# Patient Record
Sex: MALE | Race: BLACK OR AFRICAN AMERICAN | Employment: OTHER | URBAN - METROPOLITAN AREA
[De-identification: names, ages, dates, MRNs, and addresses within clinical notes are randomized per-mention and may not be internally consistent; named-entity substitution may affect disease eponyms.]

---

## 2017-04-15 ENCOUNTER — APPOINTMENT (OUTPATIENT)
Dept: ULTRASOUND IMAGING | Facility: HOSPITAL | Age: 77
DRG: 871 | End: 2017-04-15
Attending: EMERGENCY MEDICINE
Payer: MEDICARE

## 2017-04-15 ENCOUNTER — APPOINTMENT (OUTPATIENT)
Dept: GENERAL RADIOLOGY | Facility: HOSPITAL | Age: 77
DRG: 871 | End: 2017-04-15
Attending: EMERGENCY MEDICINE
Payer: MEDICARE

## 2017-04-15 ENCOUNTER — HOSPITAL ENCOUNTER (INPATIENT)
Facility: HOSPITAL | Age: 77
LOS: 9 days | Discharge: HOME | DRG: 871 | End: 2017-04-24
Attending: EMERGENCY MEDICINE | Admitting: HOSPITALIST
Payer: MEDICARE

## 2017-04-15 DIAGNOSIS — L03.115 CELLULITIS OF RIGHT LOWER EXTREMITY: ICD-10-CM

## 2017-04-15 DIAGNOSIS — I89.0 LYMPHEDEMA: Primary | ICD-10-CM

## 2017-04-15 DIAGNOSIS — J18.9 COMMUNITY ACQUIRED PNEUMONIA: ICD-10-CM

## 2017-04-15 PROCEDURE — 71010 XR CHEST AP PORTABLE  (CPT=71010): CPT

## 2017-04-15 PROCEDURE — 93970 EXTREMITY STUDY: CPT

## 2017-04-15 PROCEDURE — 99223 1ST HOSP IP/OBS HIGH 75: CPT | Performed by: HOSPITALIST

## 2017-04-15 RX ORDER — NIACIN 500 MG/1
500 TABLET, EXTENDED RELEASE ORAL NIGHTLY
Status: ON HOLD | COMMUNITY
End: 2017-04-24

## 2017-04-15 RX ORDER — IPRATROPIUM BROMIDE AND ALBUTEROL SULFATE 2.5; .5 MG/3ML; MG/3ML
3 SOLUTION RESPIRATORY (INHALATION)
Status: DISCONTINUED | OUTPATIENT
Start: 2017-04-16 | End: 2017-04-20

## 2017-04-15 RX ORDER — SIMVASTATIN 20 MG
20 TABLET ORAL NIGHTLY
COMMUNITY

## 2017-04-15 RX ORDER — DEXTROSE MONOHYDRATE 25 G/50ML
50 INJECTION, SOLUTION INTRAVENOUS AS NEEDED
Status: DISCONTINUED | OUTPATIENT
Start: 2017-04-15 | End: 2017-04-24

## 2017-04-15 RX ORDER — ACETAMINOPHEN 325 MG/1
650 TABLET ORAL EVERY 6 HOURS PRN
COMMUNITY

## 2017-04-15 RX ORDER — ACETAMINOPHEN 325 MG/1
650 TABLET ORAL EVERY 6 HOURS PRN
Status: DISCONTINUED | OUTPATIENT
Start: 2017-04-15 | End: 2017-04-24

## 2017-04-15 RX ORDER — ZINC SULFATE 50(220)MG
220 CAPSULE ORAL DAILY
COMMUNITY

## 2017-04-15 RX ORDER — ASCORBIC ACID 500 MG
500 TABLET ORAL DAILY
COMMUNITY

## 2017-04-15 RX ORDER — CARVEDILOL 3.12 MG/1
3.12 TABLET ORAL 2 TIMES DAILY WITH MEALS
COMMUNITY

## 2017-04-15 RX ORDER — ASPIRIN 325 MG
325 TABLET ORAL DAILY
Status: DISCONTINUED | OUTPATIENT
Start: 2017-04-16 | End: 2017-04-21

## 2017-04-15 RX ORDER — HEPARIN SODIUM 5000 [USP'U]/ML
5000 INJECTION, SOLUTION INTRAVENOUS; SUBCUTANEOUS EVERY 12 HOURS SCHEDULED
Status: ON HOLD | COMMUNITY
End: 2017-04-24

## 2017-04-15 RX ORDER — HEPARIN SODIUM 5000 [USP'U]/ML
5000 INJECTION, SOLUTION INTRAVENOUS; SUBCUTANEOUS EVERY 8 HOURS
Status: DISCONTINUED | OUTPATIENT
Start: 2017-04-15 | End: 2017-04-18

## 2017-04-15 RX ORDER — SODIUM CHLORIDE 9 MG/ML
INJECTION, SOLUTION INTRAVENOUS CONTINUOUS
Status: DISCONTINUED | OUTPATIENT
Start: 2017-04-15 | End: 2017-04-18

## 2017-04-15 RX ORDER — NITROGLYCERIN 0.4 MG/1
0.4 TABLET SUBLINGUAL EVERY 5 MIN PRN
Status: DISCONTINUED | OUTPATIENT
Start: 2017-04-15 | End: 2017-04-24

## 2017-04-15 RX ORDER — ASPIRIN 81 MG/1
TABLET, CHEWABLE ORAL
Status: DISPENSED
Start: 2017-04-15 | End: 2017-04-16

## 2017-04-15 RX ORDER — MONTELUKAST SODIUM 10 MG/1
10 TABLET ORAL NIGHTLY
COMMUNITY

## 2017-04-15 RX ORDER — SODIUM CHLORIDE 9 MG/ML
INJECTION, SOLUTION INTRAVENOUS
Status: COMPLETED
Start: 2017-04-15 | End: 2017-04-17

## 2017-04-15 RX ORDER — ASPIRIN 81 MG/1
324 TABLET, CHEWABLE ORAL ONCE
Status: COMPLETED | OUTPATIENT
Start: 2017-04-15 | End: 2017-04-15

## 2017-04-15 RX ORDER — DOXEPIN HYDROCHLORIDE 50 MG/1
1 CAPSULE ORAL DAILY
COMMUNITY

## 2017-04-15 RX ORDER — FUROSEMIDE 10 MG/ML
40 INJECTION INTRAMUSCULAR; INTRAVENOUS ONCE
Status: COMPLETED | OUTPATIENT
Start: 2017-04-16 | End: 2017-04-16

## 2017-04-15 RX ORDER — AMLODIPINE BESYLATE 10 MG/1
10 TABLET ORAL DAILY
COMMUNITY

## 2017-04-15 RX ORDER — RISPERIDONE 1 MG/1
1 TABLET, FILM COATED ORAL 2 TIMES DAILY
COMMUNITY

## 2017-04-15 RX ORDER — ONDANSETRON 2 MG/ML
4 INJECTION INTRAMUSCULAR; INTRAVENOUS EVERY 6 HOURS PRN
Status: DISCONTINUED | OUTPATIENT
Start: 2017-04-15 | End: 2017-04-24

## 2017-04-15 NOTE — ED NOTES
IV established. Lab work sent- blood cultures still needed. Patient to US at this time. Vitals stable.

## 2017-04-15 NOTE — ED NOTES
Phlebotomy contacted for lab work. IV placement and use of US has been unsuccessful. Additional attempts continued. Dr. Steffany Patino aware.

## 2017-04-15 NOTE — ED PROVIDER NOTES
Patient Seen in: [facility] Emergency Department    History   Patient presents with:  Deep Vein Thrombosis (cardiovascular)    Stated Complaint: Bilteral Leg pain     HPI  History is difficult as patient is a poor historian secondary to dementia/s 1 mg by mouth 2 (two) times daily. zinc sulfate 220 MG Oral Cap,  Take 220 mg by mouth daily. No family history on file.     Tobacco/Alcohol use not on file    Review of Systems    Positive for stated complaint: Bilteral Leg pain   Other systems are deficit. Skin: Skin is warm and dry. No rash noted. Psychiatric: He has a normal mood and affect. Nursing note and vitals reviewed.           ED Course     Labs Reviewed   BASIC METABOLIC PANEL (8) - Abnormal; Notable for the following:     Glucose 13 Intervals:normal        QRS: low voltage        ST segments:nonspecific T abnormalities        INTERPRETATION: Sinus tach nonspecific T abnormalities possible pulmonary disease  The EKG was interpreted by myself              CHAR     Pt with cellulitis vs D pneumonia/infiltrate. Patient developed a fever in the ER. He remained tachycardic. There was no evidence of DVT on his ultrasound and his chest x-ray had no PE however he did have a opacity in the lingular segment of the left lobe.   I am going t

## 2017-04-15 NOTE — ED INITIAL ASSESSMENT (HPI)
PT used lifeline for c/o of Chronic bilateral leg swelling/pain increased on the RLE. Patient baseline A&Ox2. Pulse in 130's. Patient placed on 2L O2 via NC pta.

## 2017-04-16 ENCOUNTER — APPOINTMENT (OUTPATIENT)
Dept: CT IMAGING | Facility: HOSPITAL | Age: 77
DRG: 871 | End: 2017-04-16
Attending: HOSPITALIST
Payer: MEDICARE

## 2017-04-16 ENCOUNTER — APPOINTMENT (OUTPATIENT)
Dept: CV DIAGNOSTICS | Facility: HOSPITAL | Age: 77
DRG: 871 | End: 2017-04-16
Attending: HOSPITALIST
Payer: MEDICARE

## 2017-04-16 PROCEDURE — 99233 SBSQ HOSP IP/OBS HIGH 50: CPT | Performed by: HOSPITALIST

## 2017-04-16 PROCEDURE — 93306 TTE W/DOPPLER COMPLETE: CPT | Performed by: INTERNAL MEDICINE

## 2017-04-16 PROCEDURE — 93306 TTE W/DOPPLER COMPLETE: CPT

## 2017-04-16 PROCEDURE — 73700 CT LOWER EXTREMITY W/O DYE: CPT

## 2017-04-16 RX ORDER — NIACIN 500 MG/1
500 TABLET, EXTENDED RELEASE ORAL NIGHTLY
Status: DISCONTINUED | OUTPATIENT
Start: 2017-04-16 | End: 2017-04-17

## 2017-04-16 RX ORDER — RISPERIDONE 1 MG/1
1 TABLET, FILM COATED ORAL 2 TIMES DAILY
Status: DISCONTINUED | OUTPATIENT
Start: 2017-04-16 | End: 2017-04-24

## 2017-04-16 RX ORDER — ACETAMINOPHEN 325 MG/1
650 TABLET ORAL EVERY 6 HOURS PRN
Status: DISCONTINUED | OUTPATIENT
Start: 2017-04-16 | End: 2017-04-16

## 2017-04-16 RX ORDER — PANTOPRAZOLE SODIUM 40 MG/1
40 TABLET, DELAYED RELEASE ORAL
Status: DISCONTINUED | OUTPATIENT
Start: 2017-04-16 | End: 2017-04-24

## 2017-04-16 RX ORDER — DOXEPIN HYDROCHLORIDE 50 MG/1
1 CAPSULE ORAL DAILY
Status: DISCONTINUED | OUTPATIENT
Start: 2017-04-16 | End: 2017-04-24

## 2017-04-16 RX ORDER — MONTELUKAST SODIUM 10 MG/1
10 TABLET ORAL NIGHTLY
Status: DISCONTINUED | OUTPATIENT
Start: 2017-04-16 | End: 2017-04-24

## 2017-04-16 RX ORDER — ATORVASTATIN CALCIUM 10 MG/1
10 TABLET, FILM COATED ORAL NIGHTLY
Status: DISCONTINUED | OUTPATIENT
Start: 2017-04-16 | End: 2017-04-17

## 2017-04-16 RX ORDER — CARVEDILOL 3.12 MG/1
3.12 TABLET ORAL 2 TIMES DAILY WITH MEALS
Status: DISCONTINUED | OUTPATIENT
Start: 2017-04-16 | End: 2017-04-17

## 2017-04-16 RX ORDER — ASCORBIC ACID 500 MG
500 TABLET ORAL DAILY
Status: DISCONTINUED | OUTPATIENT
Start: 2017-04-16 | End: 2017-04-24

## 2017-04-16 RX ORDER — AMLODIPINE BESYLATE 10 MG/1
10 TABLET ORAL DAILY
Status: DISCONTINUED | OUTPATIENT
Start: 2017-04-16 | End: 2017-04-17

## 2017-04-16 RX ORDER — ZINC SULFATE 50(220)MG
220 CAPSULE ORAL DAILY
Status: DISCONTINUED | OUTPATIENT
Start: 2017-04-16 | End: 2017-04-24

## 2017-04-16 NOTE — PROGRESS NOTES
[place] [facility] - [facility]    Progress Note    Frank Nobles Patient Status:  Inpatient    10/22/1940 MRN [id number]   Location [facility] 3W/SW Attending Ligia Anne, [address] [facility] Day # 1 PCP Alyssa Day MD       Subjective:   Dia is Oral Q15 Min PRN   aspirin tab 325 mg 325 mg Oral Daily   nitroGLYCERIN (NITROSTAT) SL tab 0.4 mg 0.4 mg Sublingual Q5 Min PRN   Atropine Sulfate 0.1 MG/ML injection 0.5 mg 0.5 mg Intravenous PRN   0.9%  NaCl infusion  Intravenous Continuous   Heparin Sodi Results  Component Value Date   HGB 11.7* 04/16/2017   HGB 12.8* 04/15/2017      Lab Results  Component Value Date    04/16/2017    04/15/2017       Recent Labs   Lab  04/15/17   1730   GLU  135*   BUN  37*   CREATSERUM  1.65*   GFRAA  49* Cont to monitor for now. PT is FULL code. 8.      History of congestive heart failure, systolic versus diastolic.  It is not clear.  Await echo results.     9.      Chronic lymphedema.  We will ask Wound Care to evaluate.  Currently, the lower extremity

## 2017-04-16 NOTE — CONSULTS
Called to see patient for potential compartment syndrome. Patient here since 1630 yesterday. Patient offers no history, he just grunts to touch. By notes and discussion with Dr. Gale Fleischer he does communicate at times, but not with me currently.  Bilateral

## 2017-04-16 NOTE — PROGRESS NOTES
120 [facility] dosing service    Initial Pharmacokinetic Consult for Vancomycin Dosing     Frank Abrams is a 77year old male admitted on 4/15 who is being treated for cellulitis.   Pharmacy has been asked to dose Vancomycin by Dr. Alexander Fiore    He is allergi panel order ED/MRSA SCREEN BY [facility].   Procedure                               Abnormality         Status                     ---------                               -----------         ------                     ED/MRSA SCREEN BY [facility][[id number]]     Nor

## 2017-04-16 NOTE — BH PROGRESS NOTE
Reevaluated patient   He did not c/o pain at all overnight. Did not request pain medication. RLE calf remains swollen but soft is, however, painful to palpation Do not feel any areas of fluctuance. Pedal pulse are easily dopplered bilaterally.   Will get

## 2017-04-16 NOTE — BH PROGRESS NOTE
ADMISSION NOTE    77year old male with h/o Lymphedema, DM, CHF Dementia COPD presents with LE edema and cough. CT positive for pneumonia no PE . Available medical records partially reviewed. Dictation to follow.     Raleigh Francisco M.D.  4/15/2017

## 2017-04-16 NOTE — H&P
[facility]    PATIENT'S NAME: MICHELLE MCGILL   ATTENDING PHYSICIAN: Ivis Oviedo MD   PATIENT ACCOUNT#:   [id number]    LOCATION:  [address] [place] [address] #:   [id number]       YOB: 1940  ADMISSION DATE:       04/1 vancomycin and Zosyn and was admitted for further evaluation and treatment. PAST MEDICAL HISTORY:    1. Hypertension. 2.   Type 1 diabetes mellitus. He states his Accu-Cheks typically run in the 200 range.   3.   Previous episodes of lower extremity the emergency room, his temperature was 101.9, heart rate of 128, respiratory rate of 22. When I saw him, his temperature was 98.3 with a heart rate of 116, respiratory rate of 22, and blood pressure of 120/58, 98% saturated.   HEENT:  Normocephalic, atrau potassium 4.3, chloride 97, CO2 28, BUN of 37 with a creatinine of 1.65, calcium 9.1, anion gap was 10. Lactic acid level was 1.5. Troponin was 0.04. INR was 1.3. White count was 12.4 with a hemoglobin of 12.8 and a platelet count of a 483,504.   There intravascular volume depletion. 5.   Diabetes mellitus type 1. Accu-Cheks a.c., at bedtime, cover with sliding scale insulin. 6.   Rule out dysphagia given the location of the patient's pneumonia raising the possibility of aspiration pneumonia.   We will

## 2017-04-16 NOTE — CONSULTS
17  0950   BP: 117/55   Pulse: 115   Temp: 98.1 °F (36.7 °C)   Resp: [facility]  Report of Consultation    Frank Nobles Patient Status:  Inpatient    10/22/1940 MRN [id number]   Location [facility] 3W/SW Attending Emanuel Hoff Daily  •  niacin (NIASPAN) CR tab, 500 mg, Oral, Nightly  •  risperiDONE (RISPERDAL) tab 1 mg, 1 mg, Oral, BID  •  Atorvastatin Calcium (LIPITOR) tab 10 mg, 10 mg, Oral, Nightly  •  zinc sulfate (ZINCATE) cap 220 mg, 220 mg, Oral, Daily  •  Pantoprazole So is gangrenous. His feet are warm and perfusion seems to be good. I could feel a very weak dorsalis pedis pulse more proximally where the edema gets a little bit less around the area of the ankle.   I inspected the areas proximal to the toes where that lym he is in the hospital and try and get that done for him while he is here. Right now I do not believe that the nidus of the infection is his right foot.   The CT scans are negative for any osteomyelitis and do not show any well-organized fluid accumulation

## 2017-04-16 NOTE — CONSULTS
[facility]    PATIENT'S NAME: MICHELLE MCGILL   ATTENDING PHYSICIAN: Mera Lees MD   CONSULTING PHYSICIAN: Rosy Shah MD   PATIENT ACCOUNT#:   [id number]    LOCATION:  [address] [place] [address] RECORD #:   [id number]       DATE OF B anxiety. SOCIAL HISTORY:  He has lived in a nursing home for 20 years. He drinks occasionally. He does not use street drugs.   At some point, he worked as a  and was  once in the past.  There was a previous tobacco history; but again, the examination. His issues seem to be related to his poor vascular function of his lower extremities, and at some point the patient may need consideration of amputation.   At this point, urgent amputation is not necessary and vascular issues can be addressed

## 2017-04-17 ENCOUNTER — APPOINTMENT (OUTPATIENT)
Dept: ULTRASOUND IMAGING | Facility: HOSPITAL | Age: 77
DRG: 871 | End: 2017-04-17
Attending: HOSPITALIST
Payer: MEDICARE

## 2017-04-17 PROCEDURE — 99233 SBSQ HOSP IP/OBS HIGH 50: CPT | Performed by: HOSPITALIST

## 2017-04-17 RX ORDER — SODIUM CHLORIDE 9 MG/ML
INJECTION, SOLUTION INTRAVENOUS CONTINUOUS
Status: DISCONTINUED | OUTPATIENT
Start: 2017-04-17 | End: 2017-04-17

## 2017-04-17 RX ORDER — CARVEDILOL 6.25 MG/1
6.25 TABLET ORAL 2 TIMES DAILY WITH MEALS
Status: DISCONTINUED | OUTPATIENT
Start: 2017-04-17 | End: 2017-04-24

## 2017-04-17 RX ORDER — 0.9 % SODIUM CHLORIDE 0.9 %
VIAL (ML) INJECTION
Status: COMPLETED
Start: 2017-04-17 | End: 2017-04-17

## 2017-04-17 RX ORDER — MORPHINE SULFATE 4 MG/ML
2 INJECTION, SOLUTION INTRAMUSCULAR; INTRAVENOUS EVERY 4 HOURS PRN
Status: DISCONTINUED | OUTPATIENT
Start: 2017-04-17 | End: 2017-04-24

## 2017-04-17 RX ORDER — MELATONIN
DAILY
Status: DISCONTINUED | OUTPATIENT
Start: 2017-04-17 | End: 2017-04-24

## 2017-04-17 RX ORDER — POTASSIUM CHLORIDE 20 MEQ/1
40 TABLET, EXTENDED RELEASE ORAL EVERY 4 HOURS
Status: COMPLETED | OUTPATIENT
Start: 2017-04-17 | End: 2017-04-18

## 2017-04-17 NOTE — CONSULTS
[facility]    PATIENT'S NAME: MICHELLE MCGILL   ATTENDING PHYSICIAN: Sallie Arellano DO   CONSULTING PHYSICIAN: Janet Horn.  Kathia Renner MD   PATIENT ACCOUNT#:   [id number]    LOCATION:  [address] [place] [address] #:   [id number]       DATE OF BIRTH:  11 when he touches the lower extremities, he complains about them feeling painful. MEDICATIONS:  Current medications include the following:  Zinc; vitamin C; vancomycin; Risperdal; Zosyn; Protonix; Norvasc; Coreg; Niaspan; Singulair; DuoNeb;  Lasix; insul and 0.04. Requesting a troponin and CK-MB for this morning. His BNP was 181. His white count was 12.9, H and H is 11 and 34.6 with platelets of 857. Patient's chest x-ray:  Pulmonary opacity in the lingular segment of the left upper lobe.   This cou records from hospital to review patient's prior cardiac history. 2.   Mildly elevated troponin. Repeat, unchanged. We will repeat this morning. If increasing, call Cardiology. Recent echocardiogram normal.  If troponin has increased, call Cardiology.

## 2017-04-17 NOTE — WOUND PROGRESS NOTE
Wound Care Services  Orders received to apply short stretch compression wraps to the pt's left leg and foot today and the right leg and foot tomorrow. The pt. is sitting up in bed, his nephew is at the bedside. Instructed the pt. and the nephew on the short

## 2017-04-17 NOTE — PLAN OF CARE
Impaired Functional Mobility    • Achieve highest/safest level of mobility/gait Not Progressing          CARDIOVASCULAR - ADULT    • Maintains optimal cardiac output and hemodynamic stability Progressing    • Absence of cardiac arrhythmias or at baseline P

## 2017-04-17 NOTE — DIABETES ED
Current A1C value of 8.1%. Patient resides at a skilled nursing facility. Diabetes Nutrition Knowledge Assessment is not indicated at this time.

## 2017-04-17 NOTE — PROGRESS NOTES
[facility]    Progress Note    Frank Nobles Patient Status:  Inpatient    10/22/1940 MRN [id number]   Location [facility] 3W/SW Attending Parish Parish, [address] [facility] Day # 2 PCP Audrey Castro MD       Subjective:   Frank Garrido is Sublingual Q5 Min PRN   Atropine Sulfate 0.1 MG/ML injection 0.5 mg 0.5 mg Intravenous PRN   0.9%  NaCl infusion  Intravenous Continuous   Heparin Sodium (Porcine) 5000 UNIT/ML injection 5,000 Units 5,000 Units Subcutaneous Q8H   ondansetron HCl (ZOFRAN) i effusion. 7. Atherosclerotic vascular calcification. No major discrepancy with preliminary [facility] radiology report.        Ekg 12-lead    4/16/2017  ECG Report  Interpretation  -------------------------- Poor data quality, interpretation may be adverse Cont ivf's. 2.      Pneumonia.  Health care acquired. - cont vanco and zosyn.    - speech eval   - modified diet     3.      Bronchospasm, possibly secondary to chronic obstructive pulmonary disease, although this is not listed as a specific problem f 50% time was spent counseling patient, discussing plan of care, discussing labs and imaging findings. Spoke with consultant. All questions answered.          6/36/9854      Certification        PHYSICIAN Certification of Need for Inpatient Hospitalization -

## 2017-04-17 NOTE — PROGRESS NOTES
Patient refusing to have legs wrapped or elevated off of bed at this time. Foot of bed elevated but patient does not want legs touched.

## 2017-04-17 NOTE — WOUND PROGRESS NOTE
WOUND CARE NOTE      PLAN   Recommendations:  Elevate legs and feet in the bed and if in the chair  Recommend to follow up with podiatry as an outpatient   PV arterial Doppler with VANDANA to determine if compression therapy is appropriate- ordered but the p pt's nurse.   Allergies: Prolixin    Labs:     Lab Results  Component Value Date   WBC 12.9* 04/17/2017   HGB 11.0* 04/17/2017   HCT 34.6* 04/17/2017    04/17/2017   CREATSERUM 1.46 04/17/2017   BUN 30* 04/17/2017   * 04/17/2017   K 3.4 04/17/2

## 2017-04-18 ENCOUNTER — APPOINTMENT (OUTPATIENT)
Dept: GENERAL RADIOLOGY | Facility: HOSPITAL | Age: 77
DRG: 871 | End: 2017-04-18
Attending: HOSPITALIST
Payer: MEDICARE

## 2017-04-18 PROCEDURE — 71010 XR CHEST AP PORTABLE  (CPT=71010): CPT

## 2017-04-18 PROCEDURE — 99233 SBSQ HOSP IP/OBS HIGH 50: CPT | Performed by: HOSPITALIST

## 2017-04-18 RX ORDER — 0.9 % SODIUM CHLORIDE 0.9 %
VIAL (ML) INJECTION
Status: DISPENSED
Start: 2017-04-18 | End: 2017-04-19

## 2017-04-18 RX ORDER — METHYLPREDNISOLONE SODIUM SUCCINATE 40 MG/ML
40 INJECTION, POWDER, LYOPHILIZED, FOR SOLUTION INTRAMUSCULAR; INTRAVENOUS EVERY 8 HOURS
Status: DISCONTINUED | OUTPATIENT
Start: 2017-04-18 | End: 2017-04-20

## 2017-04-18 RX ORDER — FUROSEMIDE 10 MG/ML
40 INJECTION INTRAMUSCULAR; INTRAVENOUS ONCE
Status: COMPLETED | OUTPATIENT
Start: 2017-04-18 | End: 2017-04-18

## 2017-04-18 RX ORDER — 0.9 % SODIUM CHLORIDE 0.9 %
VIAL (ML) INJECTION
Status: COMPLETED
Start: 2017-04-18 | End: 2017-04-18

## 2017-04-18 NOTE — PROGRESS NOTES
[facility]  Cardiology Progress Note    Frank Nobles Patient Status:  Inpatient    10/22/1940 MRN [id number]   Location [facility] 3W/SW Attending Maciej Reagan, [address] [facility] Day # 3 PCP Omi Joiner MD       Assessment and Plan:Tachycardia - Results  Component Value Date   TROP 0.06 04/17/2017    04/18/2017       Recent Labs   Lab  04/15/17   1730  04/16/17   0532  04/17/17   0625  04/18/17   0607   WBC  12.4*  11.2*  12.9*  12.5*   HGB  12.8*  11.7*  11.0*  10.8*   MCV  83.3  83.9  84.1 4 tablet Oral Q15 Min PRN   aspirin tab 325 mg 325 mg Oral Daily   nitroGLYCERIN (NITROSTAT) SL tab 0.4 mg 0.4 mg Sublingual Q5 Min PRN   Atropine Sulfate 0.1 MG/ML injection 0.5 mg 0.5 mg Intravenous PRN   0.9%  NaCl infusion  Intravenous Continuous   Hep

## 2017-04-18 NOTE — PROGRESS NOTES
Patient respirations labored and patient removing bipap. Educated patient on importance of bipap, replaced bipap. patient insists he cannot breathe with or without it and continues to remove mask. Continues to struggle with breathing.  O2 sat 95% on 3L,

## 2017-04-18 NOTE — WOUND PROGRESS NOTE
WOUND CARE NOTE      PLAN   Recommendations:  Dr. Waldo Aguilera is following the pt.    Podiatry consult for long unkept toenails  Elevate legs and feet in the bed and in the chair  PV arterial Doppler with VANDANA to determine if compression therapy were ordered, pt compression wraps, Left leg and foot wrapped, used 4 inch stockinette, artiflex 10 and 15, Comprilan 8-10-12, Right leg cleansed and dressings applied, Right leg and foot wrapped used: Stockinette 4 inch, artiflex 10 and 15, Comprilan 8-8-10-12.  The pt. c/

## 2017-04-18 NOTE — SLP NOTE
SPEECH DAILY NOTE - INPATIENT    Evaluation Date: 04/18/2017    ASSESSMENT & PLAN   ASSESSMENT  Patient seen for follow-up dysphagia tx. Patient awake and cooperative upright in bed. Patient agreeable to trials of pureeds and nectar thick liquids.   Taken over 2 session(s). Verbal review of aspiration precautions with patient. He verbalized understanding however continued to require cues for rate of intake and alternating consistencies during tx session.   Minimal cueing. (no family present)    Goal in p

## 2017-04-18 NOTE — PROGRESS NOTES
[facility] [place] [facility]    Progress Note    Frank Nobles Patient Status:  Inpatient    10/22/1940 MRN [id number]   Location [facility] 3W/SW Attending Ana Ibarra, [address] [facility] Day # 3 PCP Blu Bo MD       Subjective:   Dia is Oral Daily   nitroGLYCERIN (NITROSTAT) SL tab 0.4 mg 0.4 mg Sublingual Q5 Min PRN   Atropine Sulfate 0.1 MG/ML injection 0.5 mg 0.5 mg Intravenous PRN   0.9%  NaCl infusion  Intravenous Continuous   Heparin Sodium (Porcine) 5000 UNIT/ML injection 5,000 Uni 127*  145*   BUN  34*  30*  31*   CREATSERUM  1.62*  1.46  1.51*   GFRAA  51*  57*  55*   GFRNAA  42*  47*  45*   CA  8.0*  8.2*  8.4*   NA  134*  135*  139   K  3.7  3.4  4.1  4.1   CL  101  101  107   CO2  25  27  26             Assessment and Plan: today     8.      History of congestive heart failure, systolic versus diastolic.  Echo with normal EF. 9.      Chronic lymphedema. apprec wound care consult. Legs wrapped today     10.    DVT prophylaxis.  Subcutaneous heparin.     11.    GI prophylaxi

## 2017-04-18 NOTE — SLP NOTE
ADULT SWALLOWING EVALUATION    ASSESSMENT & PLAN   ASSESSMENT  Pt seen sitting upright in bed for limited trials. Pt with increased lethargy and required constant verbal cues for participation to BSSE.  Limited ability demonstrated to follow verbal commands Impaired  Bolus Retrieval: Intact  Bilabial Seal: Intact  Bolus Formation: Impaired  Bolus Propulsion: Impaired  Mastication:  (n/a)       Pharyngeal Phase of Swallow: Impaired  Laryngeal Elevation Timing: Impaired  Laryngeal Elevation Strength: Impaired [address]    If you have any questions, please contact Lavelle Peters

## 2017-04-18 NOTE — CM/SW NOTE
Met with patient at bedside to enroll patient in Medicare BPCI bundle program under DRG (602). BPCI brochure was provided and program was explained.  Patient is agreeable to the program's 3 month follow up phone calls, but patient resides at [facility] and wou

## 2017-04-19 PROCEDURE — 99233 SBSQ HOSP IP/OBS HIGH 50: CPT | Performed by: HOSPITALIST

## 2017-04-19 RX ORDER — 0.9 % SODIUM CHLORIDE 0.9 %
VIAL (ML) INJECTION
Status: COMPLETED
Start: 2017-04-19 | End: 2017-04-19

## 2017-04-19 RX ORDER — HEPARIN SODIUM 5000 [USP'U]/ML
5000 INJECTION, SOLUTION INTRAVENOUS; SUBCUTANEOUS EVERY 8 HOURS SCHEDULED
Status: DISCONTINUED | OUTPATIENT
Start: 2017-04-19 | End: 2017-04-21

## 2017-04-19 RX ORDER — HEPARIN SODIUM 1000 [USP'U]/ML
5000 INJECTION, SOLUTION INTRAVENOUS; SUBCUTANEOUS EVERY 8 HOURS
Status: DISCONTINUED | OUTPATIENT
Start: 2017-04-19 | End: 2017-04-19

## 2017-04-19 NOTE — SLP NOTE
ADULT SWALLOWING EVALUATION    ASSESSMENT & PLAN   ASSESSMENT  Pt seen sitting upright in bed for all PO trials of current diet, puree/nectar thick liquids. No overt signs or symptoms of aspiration were observed.  No reflexive cough, no immediate cough, and Status: Unlabored  Consistencies Trialed: Nectar thick liquids;Puree  Method of Presentation: Staff/Clinician assistance;Spoon;Cup  Patient Positioning: Upright    Oral Phase of Swallow: Impaired  Bolus Retrieval: Intact  Bilabial Seal: Intact  Bolus [facility] Established Goals: 2  Follow Up Needed: Yes  SLP Follow-up Date: 04/20/17    Thank you for your referral.   If you have any questions, please contact Tracey Salazar., SLP Student Intern

## 2017-04-19 NOTE — PROGRESS NOTES
[facility]    Progress Note    Frank Nobles Patient Status:  Inpatient    10/22/1940 MRN [id number]   [facility] 2W/SW Attending Dick Diamondissa Day # 4 PCP Waqas Anderson MD     Subjective:     Constituti Wheezing again today - repeat CXR stable. Will add steroids. Cont nebs       4.      Tachycardia. EKG with Afib. Also with pos troponin.    - . No Afib. Looks like atrial tach. Pt however would not be good candidate for coumadin.  Possible ST when celluliti April 15, 2017. 2. Atherosclerosis. 3. Prominent markings. 4. Scarring/atelectasis. 5. Osteoarthritis.                        Thiago Mcnulty MD  4/19/2017

## 2017-04-19 NOTE — PROGRESS NOTES
[facility]  Cardiology Progress Note    Frank Nobles Patient Status:  Inpatient    10/22/1940 MRN [id number]   [facility] 2W/SW Attending Dick Diamondissa Day # 4 PCP Ant Oliver MD       Assessment and Plan:Sinus ta constitutional distress. HEENT: Normocephalic, Neck: No JVD, Cardiac: Regular rate and rhythm.  S1, S2 normal.    Lungs: Clear anterior Abdomen: Soft,   Extremities: wraps in place Neurologic: Alert answering questionsSkin: Warm  Laboratory/Data:    Labs: injection 50 mL 50 mL Intravenous PRN   Glucose-Vitamin C (DEX-4) 4-0.006 g chewable tab 4 tablet 4 tablet Oral Q15 Min PRN   aspirin tab 325 mg 325 mg Oral Daily   nitroGLYCERIN (NITROSTAT) SL tab 0.4 mg 0.4 mg Sublingual Q5 Min PRN   Atropine Sulfate 0.1

## 2017-04-19 NOTE — WOUND PROGRESS NOTE
Wound Care Services  Follow up on the pt., he is in CCU now, he is sitting up in bed, watching TV, he is not short of breath at this time, Bilateral leg wraps are on, toes are warm. The pt. is oriented to person, re oriented the pt. to place and time.  Spok

## 2017-04-19 NOTE — PROGRESS NOTES
120 [facility] dosing service    Follow-up Pharmacokinetic Consult for Vancomycin Dosing     Frank Bowles is a 77year old male admitted on 4/15 who is being treated for cellulitis and pneumonia. Patient is on day 4 of Vancomycin 1.5 gm IV Q 24 hours.   Go prior to 3rd dose. Goal trough level 15-20 ug/mL. 3.  Pharmacy will need BUN/Scr daily while on Vancomycin to assess renal function. 4.  Pharmacy will follow and monitor renal function changes, toxicity and efficacy.     Madeline Dallas, PharmD  4/19/2

## 2017-04-20 PROCEDURE — 99233 SBSQ HOSP IP/OBS HIGH 50: CPT | Performed by: HOSPITALIST

## 2017-04-20 RX ORDER — METHYLPREDNISOLONE SODIUM SUCCINATE 40 MG/ML
32 INJECTION, POWDER, LYOPHILIZED, FOR SOLUTION INTRAMUSCULAR; INTRAVENOUS EVERY 8 HOURS
Status: DISCONTINUED | OUTPATIENT
Start: 2017-04-20 | End: 2017-04-21

## 2017-04-20 RX ORDER — IPRATROPIUM BROMIDE AND ALBUTEROL SULFATE 2.5; .5 MG/3ML; MG/3ML
3 SOLUTION RESPIRATORY (INHALATION) EVERY 6 HOURS PRN
Status: DISCONTINUED | OUTPATIENT
Start: 2017-04-20 | End: 2017-04-24

## 2017-04-20 RX ORDER — DEXTROSE MONOHYDRATE 25 G/50ML
50 INJECTION, SOLUTION INTRAVENOUS AS NEEDED
Status: DISCONTINUED | OUTPATIENT
Start: 2017-04-20 | End: 2017-04-20

## 2017-04-20 RX ORDER — 0.9 % SODIUM CHLORIDE 0.9 %
VIAL (ML) INJECTION
Status: DISPENSED
Start: 2017-04-20 | End: 2017-04-20

## 2017-04-20 RX ORDER — IPRATROPIUM BROMIDE AND ALBUTEROL SULFATE 2.5; .5 MG/3ML; MG/3ML
3 SOLUTION RESPIRATORY (INHALATION)
Status: DISCONTINUED | OUTPATIENT
Start: 2017-04-20 | End: 2017-04-24

## 2017-04-20 RX ORDER — 0.9 % SODIUM CHLORIDE 0.9 %
VIAL (ML) INJECTION
Status: COMPLETED
Start: 2017-04-20 | End: 2017-04-20

## 2017-04-20 NOTE — PLAN OF CARE
Impaired Functional Mobility    • Achieve highest/safest level of mobility/gait Not Progressing          CARDIOVASCULAR - ADULT    • Maintains optimal cardiac output and hemodynamic stability Progressing    • Absence of cardiac arrhythmias or at baseline P pureed diet. Accuchecks ac hs.  IV antbx and steroids. Will continue to monitor. Frequent rounding maintained.

## 2017-04-20 NOTE — SLP NOTE
SPEECH DAILY NOTE - INPATIENT        ASSESSMENT & PLAN   ASSESSMENT    Pt seen upright in bed with current diet of pureed/nectar-thick liquids (no straw) for monitoring of diet tolerance. Swallowing precautions/strategies discussed and demonstrated.  No ne upgrade of mechanical soft consistency and thin liquids without overt signs or symptoms of aspiration with 100 % accuracy over 3 session(s).     Pt presented with lethargy during session and was not deemed appropriate     GOAL NOT MET   Goal #4 The patient

## 2017-04-20 NOTE — PROGRESS NOTES
[facility]  Cardiology Progress Note    Frank Nobles Patient Status:  Inpatient    10/22/1940 MRN [id number]   Location [facility] 3W/SW Attending Dick Diamond [place] Day # 5 PCP MD Juancarlos       Assessment and Plan: Sinus t 2300 : 260 lb 11.2 oz (118.253 kg)  04/15/17 1551 : 200 lb (90.719 kg)      Tele: NSR    Physical Exam:    General: Awake and Alert answering questionsHEENT: Normocephalic  Neck: No JVD,Cardiac: Regular rate and rhythm.  S1, S2 normal.   Lungs: Clear withou Daily   risperiDONE (RISPERDAL) tab 1 mg 1 mg Oral BID   zinc sulfate (ZINCATE) cap 220 mg 220 mg Oral Daily   Pantoprazole Sodium (PROTONIX) EC tab 40 mg 40 mg Oral QAM AC   dextrose injection 50 mL 50 mL Intravenous PRN   Glucose-Vitamin C (DEX-4) 4-0.00

## 2017-04-20 NOTE — PLAN OF CARE
Problem: Diabetes/Glucose Control  Goal: Glucose maintained within prescribed range  INTERVENTIONS:  - Monitor Blood Glucose as ordered  - Assess for signs and symptoms of hyperglycemia and hypoglycemia  - Administer ordered medications to maintain glucose staff.  Goal: Absence of seizures  INTERVENTIONS  - Monitor for seizure activity  - Administer anti-seizure medications as ordered  - Monitor neurological status   Outcome: Progressing  Free from seizures.   Goal: Remains free of injury related to seizure a arrhythmias or at baseline  INTERVENTIONS:  - Continuous cardiac monitoring, monitor vital signs, obtain 12 lead EKG if indicated  - Evaluate effectiveness of antiarrhythmic and heart rate control medications as ordered  - Initiate emergency measures for l gravity, serum osmolarity and serum sodium as indicated or ordered  - Monitor response to interventions for patient’s volume status, including labs, urine output, blood pressure (other measures as available)  - Encourage oral intake as appropriate  - Instr consults as needed  - Advance activity as appropriate  - Communicate ordered activity level and limitations with patient/family   Outcome: Progressing  Bed rest, repositioning.    Goal: Maintain proper alignment of affected body part  INTERVENTIONS:  - Supp that affect risk of falls.   - [place] fall precautions as indicated by assessment.  - Educate pt/family on patient safety including physical limitations  - Instruct pt to call for assistance with activity based on assessment  - Modify environment to redu

## 2017-04-20 NOTE — WOUND PROGRESS NOTE
Wound Care Services  Follow up on the pt. for bilateral leg compression wrap changes, the pt. is in bed with his legs elevated, the pt. is visiting with his sister, the pt. c/o discomfort when his legs are touched, The pt.  is tall, will order an bed extens

## 2017-04-20 NOTE — PROGRESS NOTES
[place] [facility]    Progress Note    Frank Nobles Patient Status:  Inpatient    10/22/1940 MRN [id number]   Location [facility] 3W/SW Attending Dick Diamond Day # 5 PCP Kristian Guan MD     Subjective:     Samira Mccracken Service     Bronchospasm, possibly secondary to chronic obstructive pulmonary disease, although this is not listed as a specific problem for this patient.  Wheezing again today - repeat CXR stable. Will add steroids.  Cont nebs       4.      Tachycardia. EKG with A  04/20/2017   K 4.5 04/20/2017    04/20/2017   CO2 26 04/20/2017   * 04/20/2017   CA 8.8 04/20/2017   ALB 1.5* 04/20/2017   ALKPHO 92 04/20/2017   BILT 0.6 04/20/2017   TP 5.9 04/20/2017   AST 53* 04/20/2017   ALT 46 04/20/2017   INR 1

## 2017-04-20 NOTE — PROGRESS NOTES
[address] [facility] dosing service    Follow-up Pharmacokinetic Consult for Vancomycin Dosing     Frank Fritz is a 77year old male admitted on 4/15 who is being treated for pneumonia and cellulitis. Patient is on day 5 of Vancomycin 1.25 gm IV Q 12 hours.   G Vancomycin to assess renal function. 4.  Pharmacy will follow and monitor renal function changes, toxicity and efficacy.     Pinky Suresh, PharmD  4/20/2017  1:39 PM  [address]: [phone]

## 2017-04-21 ENCOUNTER — APPOINTMENT (OUTPATIENT)
Dept: CT IMAGING | Facility: HOSPITAL | Age: 77
DRG: 871 | End: 2017-04-21
Attending: HOSPITALIST
Payer: MEDICARE

## 2017-04-21 PROCEDURE — 99233 SBSQ HOSP IP/OBS HIGH 50: CPT | Performed by: HOSPITALIST

## 2017-04-21 PROCEDURE — 70450 CT HEAD/BRAIN W/O DYE: CPT

## 2017-04-21 RX ORDER — METHYLPREDNISOLONE SODIUM SUCCINATE 40 MG/ML
20 INJECTION, POWDER, LYOPHILIZED, FOR SOLUTION INTRAMUSCULAR; INTRAVENOUS EVERY 8 HOURS
Status: DISCONTINUED | OUTPATIENT
Start: 2017-04-21 | End: 2017-04-24

## 2017-04-21 RX ORDER — ASPIRIN 81 MG/1
81 TABLET ORAL DAILY
Status: DISCONTINUED | OUTPATIENT
Start: 2017-04-22 | End: 2017-04-24

## 2017-04-21 NOTE — PROGRESS NOTES
Manual irrigated desir with 100 of normal saline at bedside,one small clot removed. will continue to monitor.

## 2017-04-21 NOTE — PROGRESS NOTES
[address] [facility] Cardiology  Progress Note    Cleinez Nobles Patient Status:  Inpatient    10/22/1940 MRN [id number]   Location [facility] 3W/SW Attending Dick Diamond [place] Day # 6 PCP Juancarlos,

## 2017-04-21 NOTE — SLP NOTE
SPEECH DAILY NOTE - INPATIENT        ASSESSMENT & PLAN   ASSESSMENT    Pt seen upright in bed with trials current diet of pureed/nectar-thick liquids (no straw) for monitoring of diet tolerance and trials of solid to assess candidacy for diet upgrade.   [address] available. GOAL PROGRESSING      Goal #3 The patient will tolerate trial upgrade of mechanical soft consistency and thin liquids without overt signs or symptoms of aspiration with 100 % accuracy over 3 session(s). Pt presented with solid.   He refuse

## 2017-04-21 NOTE — WOUND PROGRESS NOTE
Wound Care Services  Follow up on the pt's bilateral leg and foot wraps, he is sitting up in bed, he recently finished breakfast, toes are warm, legs and feet are elevated, Comprilan wraps are on and intact. Next compression wrap change is Tuesday 4/25/17.

## 2017-04-21 NOTE — PLAN OF CARE
CARDIOVASCULAR - ADULT    • Maintains optimal cardiac output and hemodynamic stability Progressing    • Absence of cardiac arrhythmias or at baseline Progressing        Diabetes/Glucose Control    • Glucose maintained within prescribed range Progressing evening. Razo in place -- will continue to monitor output. Total assist.  Wound Rn changed dressing to BLE today. C/d/i. Plan for d/c this weekend possibly. Will continue to monitor. Frequent rounding maintained.

## 2017-04-21 NOTE — PLAN OF CARE
Problem: Diabetes/Glucose Control  Goal: Glucose maintained within prescribed range  INTERVENTIONS:  - Monitor Blood Glucose as ordered  - Assess for signs and symptoms of hyperglycemia and hypoglycemia  - Administer ordered medications to maintain glucose Outcome: Progressing  No seizure activity noted will continue to monitor.    Goal: Remains free of injury related to seizure activity  INTERVENTIONS:  - Maintain airway, patient safety and administer oxygen as ordered  - Monitor patient for seizure [place] emergency measures for life threatening arrhythmias  - Monitor electrolytes and administer replacement therapy as ordered   Outcome: Progressing    Problem: RESPIRATORY - ADULT  Goal: Achieves optimal ventilation and oxygenation  INTERVENTIONS:  - Assess f restrictions as appropriate   Outcome: Progressing    Problem: SKIN/TISSUE INTEGRITY - ADULT  Goal: Skin integrity remains intact  INTERVENTIONS  - Assess and document risk factors for pressure ulcer development  - Assess and document skin integrity  - Mon spinal or hip dislocation precautions)   Outcome: Progressing    Problem: Impaired Functional Mobility  Goal: Achieve highest/safest level of mobility/gait  Interventions:  - Assess patient’s functional ability and stability  - Promote increasing activity/ Incorporate patient and family knowledge, values, beliefs, and cultural backgrounds into the planning and delivery of care  - Encourage patient/family to participate in care and decision-making at the level they choose  - Honor patient and family perspecti

## 2017-04-21 NOTE — PROGRESS NOTES
[facility] [facility]    Progress Note    Frank Nobles Patient Status:  Inpatient    10/22/1940 MRN [id number]   Location [facility] 3W/SW Attending Dick Diamond Day # 6 PCP Audrey Castro MD     Subjective:     Nuria Villeda listed as a specific problem for this patient.  Wheezing again today - repeat CXR stable. Will add steroids. Cont nebs       4.      Tachycardia. EKG with Afib.  Also with pos troponin.  needs stress test refusing so far dw family nephew Urszula Stewart will dw him; 04/21/2017   K 4.3 04/21/2017    04/21/2017   CO2 27 04/21/2017   * 04/21/2017   CA 9.2 04/21/2017   ALB 1.5* 04/20/2017   ALKPHO 92 04/20/2017   BILT 0.6 04/20/2017   TP 5.9 04/20/2017   AST 53* 04/20/2017   ALT 46 04/20/2017   INR 1.3* 04/16

## 2017-04-22 PROCEDURE — 99233 SBSQ HOSP IP/OBS HIGH 50: CPT | Performed by: HOSPITALIST

## 2017-04-22 RX ORDER — 0.9 % SODIUM CHLORIDE 0.9 %
VIAL (ML) INJECTION
Status: DISPENSED
Start: 2017-04-22 | End: 2017-04-22

## 2017-04-22 RX ORDER — 0.9 % SODIUM CHLORIDE 0.9 %
VIAL (ML) INJECTION
Status: COMPLETED
Start: 2017-04-22 | End: 2017-04-22

## 2017-04-22 NOTE — PROGRESS NOTES
Pt desir draining bloody urine. Per day RN, it was this way all day and she communicated findings with Dr. James Thompson. She reported that Dr. James Thompson DC'd heparin and ordered to irrigate desir PRN. Order noted for DC of heparin and for UA and C&S.   Fole

## 2017-04-22 NOTE — PROGRESS NOTES
[facility] [facility]    Progress Note    Frank Nobles Patient Status:  Inpatient    10/22/1940 MRN [id number]   Location [facility] 3W/SW Attending Dick Diamond Day # 7 PCP Dea Barroso MD     Subjective:     Josue Desai Cont abx, nebs and steroids    - cont vanco and zosyn.    - speech eval - cont nectar thick liquids    - modified diet      3.      Bronchospasm, possibly secondary to chronic obstructive pulmonary disease, although this is not listed as a specific problem in room pt still refuses stress test  To Abbi Murray when stable       Results:     Lab Results  Component Value Date   WBC 17.2* 04/22/2017   HGB 10.7* 04/22/2017   HCT 34.4* 04/22/2017    04/22/2017   CREATSERUM 1.47 04/21/2017   BUN 50* 04/21/2017   NA

## 2017-04-22 NOTE — PLAN OF CARE
Diabetes/Glucose Control    • Glucose maintained within prescribed range Adequate for Discharge        Impaired Functional Mobility    • Achieve highest/safest level of mobility/gait Adequate for Discharge        MUSCULOSKELETAL - ADULT    • Return mobilit Continue IV antibiotics and oxygen therapy. Oral care provided q2h and prn today. Patient does not like to be repositioned. Frequent repositioning performed after some encouragement to patient by staff. Sacrum remains intact w/ mepilex in place.  Will raymond

## 2017-04-22 NOTE — DISCHARGE PLANNING
MD order received regarding plan to discharge to [facility] on Monday. Updated information has been sent to [facility] today 4/22. SW team will follow up on Monday.       Merle Gold [facility] ext [phone]

## 2017-04-22 NOTE — PLAN OF CARE
Diabetes/Glucose Control    • Glucose maintained within prescribed range Not Progressing    Pt hyperglycemic at hs.   Orders rec'd for novolog coverage      CARDIOVASCULAR - ADULT    • Maintains optimal cardiac output and hemodynamic stability Progressing

## 2017-04-22 NOTE — RESPIRATORY THERAPY NOTE
Patient refused BIPAP therapy. [facility] has educated the patient on the purpose of and need for this therapy as well as potential negative outcomes associated with deferring treatment.  Despite education, patient maintains refusal.

## 2017-04-23 PROCEDURE — 99233 SBSQ HOSP IP/OBS HIGH 50: CPT | Performed by: HOSPITALIST

## 2017-04-23 NOTE — PROGRESS NOTES
[facility] dosing service    Follow-up Pharmacokinetic Consult for Vancomycin Dosing     Segundoinez Blair is a 77year old male , who is being treated for pneumonia and cellulitis . Patient is on day 8 of [id number] IV q12h.   Goal trough is 15-20 ug/mL renal function changes, toxicity and efficacy.     Trang Turk [facility]  4/22/2017  10:26 PM  [address]: [phone]

## 2017-04-23 NOTE — PLAN OF CARE
IRRIGATED OLSON CATHETER W/ 120 ML OF STERILE WATER. NO CLOTS NOTED. URINE RETURN CLEAR YELLOW IN APPEARANCE.

## 2017-04-23 NOTE — PLAN OF CARE
Diabetes/Glucose Control    • Glucose maintained within prescribed range Not Progressing    HS accucheck elevated.   Order rec'd for Novolog coverage per MD.      CARDIOVASCULAR - ADULT    • Maintains optimal cardiac output and hemodynamic stability Progres

## 2017-04-23 NOTE — PROGRESS NOTES
[facility] [facility] - [facility]    Progress Note    Cleinez Nobles Patient Status:  Inpatient    10/22/1940 MRN [id number]   Location [facility] 3W/SW Attending Dick Diamond [place] Day # 8 PCP Chris [place] MD     Subjective:     Respirator      3.      Bronchospasm, possibly secondary to chronic obstructive pulmonary disease, although this is not listed as a specific problem for this patient.  Wheezing again today - repeat CXR stable. Will add steroids.  Cont nebs       4.      Tachycardia. E room pt still refuses stress test  To Maximo Ngo when stable       Results:     Lab Results  Component Value Date   WBC 18.2* 04/23/2017   HGB 11.0* 04/23/2017   HCT 35.1* 04/23/2017   * 04/23/2017   CREATSERUM 1.39 04/23/2017   BUN 41* 04/23/2017   NA 14

## 2017-04-23 NOTE — SLP NOTE
SPEECH VISIT NOTE    Attempted to see pt this date for dysphagia follow up; however, pt declined all PO trials attempted. Per RN, pt consumed breakfast this am with no overt s/s of aspiration. Speech will continue to follow.       Ruperto Linares4

## 2017-04-23 NOTE — PROGRESS NOTES
[facility]    CDI Prediction Tool Protocol    OVP (oral vancomycin prophylaxis) 125 mg PO BID is being started in this patient based on a score of 14.   This patient is currently at high risk for developing CDI due to his/her score being >/=13 po

## 2017-04-24 VITALS
RESPIRATION RATE: 20 BRPM | BODY MASS INDEX: 36.37 KG/M2 | TEMPERATURE: 98 F | DIASTOLIC BLOOD PRESSURE: 67 MMHG | HEART RATE: 63 BPM | WEIGHT: 292.5 LBS | OXYGEN SATURATION: 97 % | HEIGHT: 75 IN | SYSTOLIC BLOOD PRESSURE: 122 MMHG

## 2017-04-24 PROCEDURE — 99239 HOSP IP/OBS DSCHRG MGMT >30: CPT | Performed by: HOSPITALIST

## 2017-04-24 RX ORDER — IPRATROPIUM BROMIDE AND ALBUTEROL SULFATE 2.5; .5 MG/3ML; MG/3ML
3 SOLUTION RESPIRATORY (INHALATION)
Status: DISCONTINUED | OUTPATIENT
Start: 2017-04-24 | End: 2017-04-24

## 2017-04-24 RX ORDER — 0.9 % SODIUM CHLORIDE 0.9 %
VIAL (ML) INJECTION
Status: COMPLETED
Start: 2017-04-24 | End: 2017-04-24

## 2017-04-24 RX ORDER — PANTOPRAZOLE SODIUM 40 MG/1
40 TABLET, DELAYED RELEASE ORAL
Qty: 30 TABLET | Refills: 0 | Status: SHIPPED | OUTPATIENT
Start: 2017-04-24

## 2017-04-24 RX ORDER — MELATONIN
1 DAILY
Qty: 1 G | Refills: 0 | Status: SHIPPED | OUTPATIENT
Start: 2017-04-24

## 2017-04-24 RX ORDER — PREDNISONE 20 MG/1
60 TABLET ORAL DAILY
Qty: 30 TABLET | Refills: 0 | Status: SHIPPED | OUTPATIENT
Start: 2017-04-24

## 2017-04-24 RX ORDER — CEFADROXIL 500 MG/1
500 CAPSULE ORAL 2 TIMES DAILY
Qty: 11 CAPSULE | Refills: 0 | Status: SHIPPED | OUTPATIENT
Start: 2017-04-24

## 2017-04-24 NOTE — PLAN OF CARE
Discharge Note: Report is given to [facility] at [facility]. Dressings and medications were discussed. Pts diet as well as glucose scans to be done. Pts saline lock and tele monitor were removed.  Pt was seen by wound care with dressings done, by Kika Uriarte pt a

## 2017-04-24 NOTE — WOUND PROGRESS NOTE
Wound Care Services  Routine bilateral leg compression wrap change, Dr Montserrat Gray is here to assess the pt's bilateral legs, Left leg and foot are intact with a decrease in the edema to the foot and leg, Right leg lateral and medial aspect with popped blist

## 2017-04-24 NOTE — DISCHARGE PLANNING
4/24/17  Discharge planning   Spoke with Ilene Rader at [facility], bed available today at 5:00 pm, RN report [phone], transport   Arranged via [facility] ambulance, pt is on O2 2l.    Pt and family aware and in agreement with above return to [address]  X

## 2017-04-26 NOTE — DISCHARGE SUMMARY
[facility]    PATIENT'S NAME: MICHELLE MCGILL   ATTENDING PHYSICIAN: Augusto Diamond MD   PATIENT ACCOUNT#:   [id number]    LOCATION:  [address] [place] [address] #:   [id number]       YOB: 1940  ADMISSION DATE:       04/15/20 oriented to himself, not very friendly or cooperative at this time. LABORATORY STUDIES ON DISCHARGE:  Please see chart. ASSESSMENT AND PLAN:    1. Bilateral leg cellulitis possibly causing sepsis and encephalopathy. Completed IV course.   Will lynch nightly. 7.   Risperdal 1 mg twice a day. 8.   Amlodipine 10 mg daily. 9.   Cefadroxil 500 mg twice a day for 11 days. 10.   Prednisone 30 mg a day for 2 days, 20 mg a day for 2 days, 10 mg a day for 2 days, then stop.   11.   Minerin cream to skin as n

## 2017-05-24 ENCOUNTER — PRIOR ORIGINAL RECORDS (OUTPATIENT)
Dept: OTHER | Age: 77
End: 2017-05-24

## 2025-01-20 NOTE — PLAN OF CARE
Inpatient to Inpatient Transfer    Reason for Transfer:  No longer ccu status    SBAR Reviewed and Verbal Report:  Given to Hira NICOLAS    Patient's Family Notified of Transfer and Given Unit Phone Number/Visiting Hours:  Yes     Comments:  PATIENT TRANSFER Patient transferred from 438 to 422 following an episode of diaphoresis with bradycardia, vss, tele reading sr hr 70's, pt lethargic, responds to voice, follows simple commands, right sided weakness noted, speech slurred, family at bedside, awaiting results of ct of brain.

## (undated) NOTE — IP AVS SNAPSHOT
Patient Demographics     Address Phone E-mail Address    [address] [place], [place] [address] [phone] (Home) [email or web address]      Emergency Contact(s)     Name Relation Home Work Mobile    Jacques Mendes Relative   [phone]      Allergies as of 4/24 Why:  As needed    Contact information:    [address] [phone]           Your medication list      TAKE these medications        Instructions Authorizing Provider    Morning Afternoon Evening As Needed    aceta Next dose due:  4/24/17 evening          Inject 32 Units into the skin nightly.     Augusto Diamond                           Insulin Lispro 100 UNIT/ML Soct   Commonly known as:  HUMALOG   Next dose due:  4/24/17 evening          Inject 13 Units in Take 2.5 mL (125 mg total) by mouth 2 (two) times daily.     Augusto Diamond                              zinc sulfate 220 MG Caps   Last time this was given:  220 mg on 4/24/2017  8:07 AM   Commonly known as:  ZINCATE   Next dose due:  4/25/17 mor [id number] acetaminophen (TYLENOL) tab 650 mg 04/24/17 0806 Given      [id number] aspirin EC tab 81 mg 04/24/17 0807 Given      [id number] carvedilol (COREG) tab 6.25 mg 04/23/17 1729 Given      [id number] carvedilol (COREG) tab 6.25 mg 04/24/17 0807 Given Most Recent Value    Mode Spontaneous/Timed    Interface Full face mask    Mask size Large    Set rate 12 breaths/min    Set IPAP 10    Set EPAP 5    O2% 35 %    I time 1    Flow rate 3 lpm         Lab Results Last 24 Hours (04/24/17 - 04/24/17)      T MAGNESIUM [[id number]] (Normal)  Resulted: 04/24/17 1236, Result status: Final result    Ordering provider: Alfredo Miller MD  04/24/17 1113 Resulting lab: [place] [facility]    Specimen Information    Type Source Collected On   Blood  04/24/17 1146 Order Status:  Completed Lab Status:  Final result Updated:  04/20/17 2100    Specimen Information:  Blood from Blood,peripheral      Blood Culture Result No Growth 5 Days     Respiratory Panel FLU expanded Once [[id number]] Collected:  04/16/17 0044    Or HISTORY AND PHYSICAL EXAMINATION    CHIEF COMPLAINT:  Pain in his legs. HISTORY OF PRESENT ILLNESS:  This is a 71-year-old gentleman who resides at [facility] facility.   He was apparently sent to the emergency room for evaluation by staff because of l 3.   Previous episodes of lower extremity cellulitis. 4.   Chronic kidney disease, stage not known. 5.   Congestive heart failure. Whether this is systolic or diastolic is not known. 6.   Lymphedema. 7.   Dementia. 8.   Schizoaffective disorder.   9. 120/58, 98% saturated. HEENT:  Normocephalic, atraumatic. Pupils equal, reactive to light. Sclerae anicteric. There was no sinus tenderness. Posterior pharynx was not injected. Mucous membranes were dry.   Dentition was poor with multiple missing and count was 12.4 with a hemoglobin of 12.8 and a platelet count of a 103,656. There were 96% neutrophils. His urine revealed no signs of infection. Blood cultures were pending. MRSA screen was negative. Influenza panel was negative as well.   BNP was 181 6.   Rule out dysphagia given the location of the patient's pneumonia raising the possibility of aspiration pneumonia. We will get a swallow study and thicken liquids. 7.   Dementia/schizophrenia. Continue outpatient medications for this.   8.   History history of hypertension, diabetes, lower extremity cellulitis, chronic renal insufficiency, CHF, lymphedema, dementia, schizoaffective disorder, history of rhabdomyolysis, COPD, and generalized muscle weakness. Patient lives at [facility].   He FAMILY HISTORY:  Limited. REVIEW OF SYSTEMS:  Positive as stated above. Lower extremity pain to touch, increased swelling. No chest pain. He has been having some shortness of breath, cough, some nausea.   He does state he is know to have an elevated be adversely affected. There was sinus tachycardia with short [place], non-specific ST-T wave abnormality, low voltage, possible pulmonary disease. No prior EKGs available. A second EKG is read as poor quality; interpretation may be adversely affected. patient complain of any chest pain. Continue to monitor. 3.   Marked lymphedema. Wound Service is already a consultation. Further recommendations pending on the above. 4.   Abnormal chest x-ray. Antibiotics per PCP.     5.   Increasing CPK, rule ou -------------------------------------------------------------------------------      Transthoracic Echocardiography M-mode, complete 2D, complete spectral Doppler, and color Doppler -------------------------------------------------------------------------- echocardiography. M-mode, complete 2D, complete spectral Doppler, and color Doppler. Patient YOB: 1940. Patient is 76yr old. Gender: male. BMI: 32.5kg/m^2. BSA: 2.53m^2. Patient status:  Inpatient.   Study date:  Study date: April 16, 201 43-52 LV internal dimension, ES, chordal level, PLAX 28.8 mm     23-38 Fractional shortening, chordal level, PLAX       37 %      >29 LV posterior wall thickness, ED                10.9 mm     ------- IVS/LVPW ratio, ED                              0.7 value. Asterisk (*) marks values outside specified normal range. Electronically signed       04/16/2017 22:04 Huy Sims MD         Cath Angiogram Results (HF Patients only)    No exam resulted this encounter.         Physical Therapy Notes (last 72 hours

## (undated) NOTE — IP AVS SNAPSHOT
[address]  [address] [facility] [phone]                Discharge Summary   4/15/2017    Frank Nobles           Admission Information        Provider Department    4/15/2017 Keya Geiger MD Em Take 1 tablet (40 mg total) by mouth every morning before breakfast.    Augusto Diamond                           predniSONE 20 MG Tabs   Commonly known as:  DELTASONE   Next dose due:  In a.m. 4/25/17        Take 3 tablets (60 mg total) by mouth aspirin 81 MG Tabs   Last time this was given:  325 mg on 4/21/2017  9:24 AM   Next dose due:  4/25/17 morning          Take 81 mg by mouth daily.                             carvedilol 3.125 MG Tabs   Last time this was given:  6.25 mg o - Pantoprazole Sodium 40 MG Tbec  - predniSONE 20 MG Tabs  - vancomycin 50 MG/ML Soln              Patient Instructions       Wound care as per wound clinic. Pt full code for now. Pt pulls at catheter, if hematuria please flush vigorously, should clear. Immunization History as of 4/24/2017  Never Reviewed    No immunizations on file. Recent Hematology Lab Results  (Last 3 results in the past 90 days)    WBC RBC Hemoglobin Hematocrit MCV MCH MCHC RDW Platelet MPV    (64/43/11)  18.3 (H) (04/24/17)  4. RAINBOW DRAW DARK GREEN Collected (04/15/17 1569)      Radiology Exams     None      Patient Belongings       Most Recent Value    All belongings returned to patient at discharge Pt's bedside belongings    Medications Sent Home None to return    Medicatio _____________________________________________________________________________    Medication Side Effects - Medications to be taken at home  As your caregivers, we want you to be aware of the medications you are prescribed to take and their potential SIDE E What to report to your healthcare team: Unusual bleeding, abdominal pain, dark, loose bowel movements           Blood Sugar Medications     insulin aspart 100 UNIT/ML Subcutaneous Solution Pen-injector    insulin glargine (LANTUS) 100 UNIT/ML Subcutaneous Most common side effects:  Increased blood sugar, high blood pressure, fluid retention, mood changes, stomach upset, headache, dizziness   What to report to your healthcare provider: Increase in blood sugar, high blood pressure, fluid retention, mood change